# Patient Record
(demographics unavailable — no encounter records)

---

## 2020-01-01 NOTE — PCM.DS
Discharge Summary


Date of Admission: 


20 13:20





Admitting Physician: 


NANCI PEREZ





Primary Care Provider: 


NANCI PEREZ








Allergies


Allergies





No Known Drug Allergies Allergy (Unverified 20 16:26)


 











Hospital Summary





- Hospital Course


Hospital Course: 





Baby born to 24 yo  at 37w 2d, labor induced due to uncontrolled diabetes 

in pregnancy.  Apgars 9 at 1 min and 9 at 5 min; birth weight 6lb 6oz.  On DOL #

1 weight to 6lb 1oz, and the same today on DOL #2.  Baby is bottle feeding very 

well, urinating and stooling well.  To be discharged home today with mom.





- Vitals & Intake/Output


Vital Signs: 





 Vital Signs











Temperature  98.3 F   20 01:00


 


Pulse Rate  144   20 01:00


 


Respiratory Rate  52   20 01:00


 


Blood Pressure  63/19   20 16:14


 


O2 Sat by Pulse Oximetry  100   20 14:17











Intake & Output: 





 Intake & Output











 20





 11:59 11:59 11:59 11:59


 


Weight   2.892 kg 2.751 kg














Discharge Exam


General Appearance: alert, other (quiet with mom; cries appropriately during 

exam)


Neurologic Exam: other (ant font normotensive; moves extremities equally)


Eye Exam: eyes nml inspection


Ears, Nose, Throat Exam: moist mucous membranes


Respiratory Exam: normal breath sounds, lungs clear, No crackles/rales, No 

rhonchi, No wheezing


Cardiovascular Exam: regular rate/rhythm, normal heart sounds, No murmur


Gastrointestinal/Abdomen Exam: soft, normal bowel sounds, No distention, No mass


Extremity Exam: normal inspection


Skin Exam: normal color, warm, dry, No rash





Final Diagnosis/Problem List





- Final Discharge Diagnosis/Problem


(1) Normal  (single liveborn)


Current Visit: Yes   Status: Acute   


Assessment & Plan: 


Doing great.  RTC in 1 wk.  Discussed with parents to call for same day appt if 

any fever, cough, not feeding well, or other concerns.


Code(s): Z38.2 - SINGLE LIVEBORN INFANT, UNSPECIFIED AS TO PLACE OF BIRTH   





- Discharge


Disposition: Home, Self-Care


Condition: Good


Prescriptions: 


No Action


   No Reportable Medications [No Reported Medications] 


Follow up with: 


NANCI PEREZ [Primary Care Provider] - 1 Week